# Patient Record
Sex: MALE
[De-identification: names, ages, dates, MRNs, and addresses within clinical notes are randomized per-mention and may not be internally consistent; named-entity substitution may affect disease eponyms.]

---

## 2023-04-21 PROBLEM — Z00.00 ENCOUNTER FOR PREVENTIVE HEALTH EXAMINATION: Status: ACTIVE | Noted: 2023-04-21

## 2023-05-16 ENCOUNTER — APPOINTMENT (OUTPATIENT)
Dept: NEPHROLOGY | Facility: CLINIC | Age: 38
End: 2023-05-16
Payer: COMMERCIAL

## 2023-05-16 ENCOUNTER — NON-APPOINTMENT (OUTPATIENT)
Age: 38
End: 2023-05-16

## 2023-05-16 VITALS — SYSTOLIC BLOOD PRESSURE: 126 MMHG | WEIGHT: 182 LBS | DIASTOLIC BLOOD PRESSURE: 80 MMHG | HEART RATE: 68 BPM

## 2023-05-16 DIAGNOSIS — R79.89 OTHER SPECIFIED ABNORMAL FINDINGS OF BLOOD CHEMISTRY: ICD-10-CM

## 2023-05-16 DIAGNOSIS — E83.52 HYPERCALCEMIA: ICD-10-CM

## 2023-05-16 DIAGNOSIS — F90.9 ATTENTION-DEFICIT HYPERACTIVITY DISORDER, UNSPECIFIED TYPE: ICD-10-CM

## 2023-05-16 DIAGNOSIS — E87.20 ACIDOSIS, UNSPECIFIED: ICD-10-CM

## 2023-05-16 DIAGNOSIS — Z78.9 OTHER SPECIFIED HEALTH STATUS: ICD-10-CM

## 2023-05-16 DIAGNOSIS — I10 ESSENTIAL (PRIMARY) HYPERTENSION: ICD-10-CM

## 2023-05-16 PROCEDURE — 99204 OFFICE O/P NEW MOD 45 MIN: CPT

## 2023-05-16 RX ORDER — METOPROLOL SUCCINATE 50 MG/1
50 TABLET, EXTENDED RELEASE ORAL
Refills: 0 | Status: ACTIVE | COMMUNITY

## 2023-05-16 RX ORDER — DEXTROAMPHETAMINE SULFATE, DEXTROAMPHETAMINE SACCHARATE, AMPHETAMINE SULFATE AND AMPHETAMINE ASPARTATE 5; 5; 5; 5 MG/1; MG/1; MG/1; MG/1
20 CAPSULE, EXTENDED RELEASE ORAL
Refills: 0 | Status: ACTIVE | COMMUNITY

## 2023-05-16 RX ORDER — FENOFIBRATE 145 MG/1
145 TABLET, COATED ORAL
Refills: 0 | Status: ACTIVE | COMMUNITY

## 2023-05-16 RX ORDER — COLD-HOT PACK
125 MCG EACH MISCELLANEOUS
Refills: 0 | Status: ACTIVE | COMMUNITY

## 2023-05-16 RX ORDER — EZETIMIBE 10 MG/1
10 TABLET ORAL
Refills: 0 | Status: ACTIVE | COMMUNITY

## 2023-05-16 NOTE — PHYSICAL EXAM
[General Appearance - Alert] : alert [General Appearance - In No Acute Distress] : in no acute distress [] : no respiratory distress [Auscultation Breath Sounds / Voice Sounds] : lungs were clear to auscultation bilaterally [Heart Rate And Rhythm] : heart rate was normal and rhythm regular [Heart Sounds] : normal S1 and S2 [Heart Sounds Gallop] : no gallops [Murmurs] : no murmurs [Heart Sounds Pericardial Friction Rub] : no pericardial rub [No CVA Tenderness] : no ~M costovertebral angle tenderness [Edema] : there was no peripheral edema [Oriented To Time, Place, And Person] : oriented to person, place, and time [Impaired Insight] : insight and judgment were intact [Affect] : the affect was normal

## 2023-05-22 LAB
25(OH)D3 SERPL-MCNC: 64.9 NG/ML
ALBUMIN SERPL ELPH-MCNC: 5 G/DL
ANION GAP SERPL CALC-SCNC: 14 MMOL/L
APPEARANCE: CLEAR
BACTERIA: NEGATIVE /HPF
BILIRUBIN URINE: NEGATIVE
BLOOD URINE: NEGATIVE
BUN SERPL-MCNC: 21 MG/DL
CALCIUM SERPL-MCNC: 10 MG/DL
CAST: 0 /LPF
CHLORIDE SERPL-SCNC: 103 MMOL/L
CO2 SERPL-SCNC: 25 MMOL/L
COLOR: YELLOW
CREAT SERPL-MCNC: 0.96 MG/DL
EGFR: 103 ML/MIN/1.73M2
EPITHELIAL CELLS: 0 /HPF
GLUCOSE QUALITATIVE U: NEGATIVE MG/DL
GLUCOSE SERPL-MCNC: 96 MG/DL
KETONES URINE: NEGATIVE MG/DL
LEUKOCYTE ESTERASE URINE: NEGATIVE
MICROSCOPIC-UA: NORMAL
NITRITE URINE: NEGATIVE
PH URINE: 6
PHOSPHATE SERPL-MCNC: 3.1 MG/DL
POTASSIUM SERPL-SCNC: 4.3 MMOL/L
PROTEIN URINE: NEGATIVE MG/DL
RED BLOOD CELLS URINE: 0 /HPF
SODIUM SERPL-SCNC: 142 MMOL/L
SPECIFIC GRAVITY URINE: 1.01
UROBILINOGEN URINE: 0.2 MG/DL
WHITE BLOOD CELLS URINE: 0 /HPF

## 2023-05-30 ENCOUNTER — TRANSCRIPTION ENCOUNTER (OUTPATIENT)
Age: 38
End: 2023-05-30

## 2023-06-08 NOTE — HISTORY OF PRESENT ILLNESS
[FreeTextEntry1] : 36 yo man here for further evaluation and management of possible dehydration in setting of hypercalcemia and azotemia. Saw endo for elevated Calcium- reports negative work up and was told that he is dehydrated. \par + HTN, No DM cancers, thyroid disease. No prior h/o kidney issues\par No overt GI losses, but does mention that he has a 1 yo and frequently get ill when child is ill- include diarrhea- not much but has happened. Says he drinks a lot. \par Able to sleep throughout the night without needing to void. Does void frequently at work, but he uses a bathroom trip to help clear his head and does not always pass a lot of urine. Urine mostly yellow, occ clear towards end of work day.\par No regular use of NSAIDS- took a few for a recent injury over past few weeks.\par No PPIs \par No FHx of kidney issues.\par Denies flank pain, dysuria, hematuria or frothy urine

## 2023-06-08 NOTE — CONSULT LETTER
[Dear  ___] : Dear ~IMTIAZ, [Consult Letter:] : I had the pleasure of evaluating your patient, [unfilled]. [Please see my note below.] : Please see my note below. [Consult Closing:] : Thank you very much for allowing me to participate in the care of this patient.  If you have any questions, please do not hesitate to contact me. [FreeTextEntry2] : Gabriele Varela MD\par [address]\[place] [address]  [FreeTextEntry1] : His blood pressure was 126/80 mmHg and his exam was without focal findings. I sent him for repeat labs and urine studies. I will keep you apprised of my findings.  [FreeTextEntry3] : Best personal regards,\par Khloe\par \par Khloe Zamora MD, FACP\par Professor of Medicine\par [employer] [employer] [facility] [employer] [facility]\par \par

## 2023-06-08 NOTE — ASSESSMENT
[FreeTextEntry1] : all lab data was reviewed with patient in detail from 4/14/2023\par B/creat 21/1.48, K 4.3; CO2 16, Ca 10.4\par B/creat 20/1.21 CO2 26 in 11/2022\par 38 yo man with azotemia, HTN, hypercalcemia and probable metabolic acidosis on last lab data\par BP acceptable here today.\par reviewed home BP monitoring- may want to implement- info sheet provided\par Send for repeat labs now with u/a\par h/o and volume status and Na level not c/w DI\par Ate and drank fluids prior this OV- will see how he is trending.\par Can track I/O\par Typical volume contraction would raise serum CO20 his latest was 16- ? diarrhea or other intrinsic renal process- await new data \par also consider a further reduction in Vit D- prior level was 74 (2/2023)- from his portal on phone.\par At that time dosage reduced from 10K daily to 5K daily-\par \par will call to discuss results and plan \par f/u OV TBD after new data\par \par \par ADDENDUM:\par repeat labs WNL- Ca 10,  creat .96, u/a bland (SG 1.0080 Vit d  64.9- high normal-\par no additional work up indicated at this time\par reminded pt to drink water prior to having labs drawn\par f/u here as needed at discretion of Dr. Varela.\par

## 2023-07-11 ENCOUNTER — APPOINTMENT (OUTPATIENT)
Dept: NEPHROLOGY | Facility: CLINIC | Age: 38
End: 2023-07-11

## 2024-11-03 ENCOUNTER — EMERGENCY (EMERGENCY)
Facility: HOSPITAL | Age: 39
LOS: 1 days | Discharge: HOME | End: 2024-11-03
Admitting: EMERGENCY MEDICINE
Payer: COMMERCIAL

## 2024-11-03 VITALS
OXYGEN SATURATION: 99 % | SYSTOLIC BLOOD PRESSURE: 134 MMHG | TEMPERATURE: 98 F | RESPIRATION RATE: 18 BRPM | DIASTOLIC BLOOD PRESSURE: 90 MMHG | HEART RATE: 73 BPM | WEIGHT: 175.05 LBS

## 2024-11-03 DIAGNOSIS — K64.4 RESIDUAL HEMORRHOIDAL SKIN TAGS: ICD-10-CM

## 2024-11-03 DIAGNOSIS — E78.5 HYPERLIPIDEMIA, UNSPECIFIED: ICD-10-CM

## 2024-11-03 DIAGNOSIS — I10 ESSENTIAL (PRIMARY) HYPERTENSION: ICD-10-CM

## 2024-11-03 PROCEDURE — 99284 EMERGENCY DEPT VISIT MOD MDM: CPT

## 2024-11-03 RX ORDER — HYDROCORTISONE 1 %
1 OINTMENT (GRAM) TOPICAL
Qty: 1 | Refills: 0
Start: 2024-11-03 | End: 2024-11-16

## 2024-11-03 NOTE — ED PROVIDER NOTE - CLINICAL SUMMARY MEDICAL DECISION MAKING FREE TEXT BOX
40 y/o m presents c/o external hemorrhoid.  Pt with nonbleeding, nonthrombosed external hemorrhoid on exam, pt seen in ED by surgery who recommends supportive care, sitz baths, topical steroid, f/u in office.

## 2024-11-03 NOTE — ED PROVIDER NOTE - GASTROINTESTINAL, MLM
Abdomen soft, non-tender, no guarding.  Rectal ~1.5 cm nonthrombosed, non bleeding external hemorrhoid

## 2024-11-03 NOTE — ED PROVIDER NOTE - PATIENT PORTAL LINK FT
You can access the FollowMyHealth Patient Portal offered by [facility] by registering at the following website: [email or web address]. By joining [facility]’s FollowMyHealth portal, you will also be able to view your health information using other applications (apps) compatible with our system.

## 2024-11-03 NOTE — CONSULT NOTE ADULT - SUBJECTIVE AND OBJECTIVE BOX
40yo Male pt with PMH of HTN, HLD, Hemorrhoids, ADHD; and no remarkable PSH. Patient came to ED with complaints of hemorrhoid causing discomfort and pain for the past 4-5 days. Patient refers he was traveling, and had change in diet having stomach discomfort, now improved, and started having having pain from perianal area and discomfort when seating. Patient was previously seen 2 months ago by Dr. Grimm for internal hemorrhoids with some bleeding on wiping, and around 3 years ago for external hemorrhoids, refers previous episodes but never as bad as this one. Patient refers this occasion tried conservative treatment with fiber rich meals, sitz baths with epsom salts, Prep-H ointment, and lidocaine wipes.     In the ED, pt afebrile, nontachycardic, normotensive, and satting on RA. On exam, perianal and anal area with skin with external hemorrhoid and visualization of internal hemorrhoid component, moderate tenderness on manipulation.     PMH: HTN, HLD, hemorrhoids, ADHD  PSHx: Denies  Medications: Aderall, metoprolol, fenofibrate, zetia  Allergies: Denies  Social Hx: No smoker, alcohol use 2-3 days/week - 1/2 bottle of wine  Family Hx: Father with non-hodgkin lymphoma and DM.   Last colonoscopy: Denies  Last EGD: Denies    T(C): 36.8 (11-03-24 @ 11:31), Max: 36.8 (11-03-24 @ 11:31)  HR: 73 (11-03-24 @ 11:31) (73 - 73)  BP: 134/90 (11-03-24 @ 11:31) (134/90 - 134/90)  RR: 18 (11-03-24 @ 11:31) (18 - 18)  SpO2: 99% (11-03-24 @ 11:31) (99% - 99%)    Physical Exam  General: AAOx3, NAD, laying comfortably in bed  Cardio: S1,S2, No MRG  Pulm: Nonlabored breathing  Abdomen: Soft, NT, ND, NG, NR.  Perianal and anal area with skin with external hemorrhoid and visualization of internal hemorrhoid component, moderate tenderness on manipulation.   Extremities: WWP, peripheral pulses appreciated

## 2024-11-03 NOTE — ED ADULT NURSE NOTE - OBJECTIVE STATEMENT
Pt c/o anal pain due to hemorrhoids, states he has tried using lidocaine whips, Preparation H, and epsom salt baths with no relief. Pt sitting in chair comfortably, pt aox4, respirations even and unlabored, NAD at this time. Able to make needs known. Safety and fall precautions maintained.

## 2024-11-03 NOTE — ED PROVIDER NOTE - OBJECTIVE STATEMENT
40 y/o m presents c/o hemorrhoid for the past few days.  Pt stating it is uncomfortable, sometimes painful.  Pt denies rectal bleeding, straining to have bowel movement, abd pain, all other ROS negative.

## 2024-11-03 NOTE — CONSULT NOTE ADULT - ASSESSMENT
38yo Male pt with complaints of hemorrhoid causing discomfort and pain for the past 4-5 days. Patient was previously seen 2 months ago by Dr. Grimm for internal hemorrhoids with some bleeding on wiping. Patient refers this occasion tried conservative treatment with fiber rich meals, sitz baths with epsom salts, Prep-H ointment, and lidocaine wipes. In the ED, VSS stable.  On exam, perianal and anal area with skin with external hemorrhoid and visualization of internal hemorrhoid component, moderate tenderness on manipulation. Patient not amnable to ED drainage at the time due to external hemorrhoid with internal component, it is expected for pain to improve within the next few days with further conservative measures and pain control.     Plan:    Outpatient follow up with Dr. Grimm on 11/04  Pain management  Sitz baths  Fiber rich diet, fiber supplements if necessary  Good area hygiene  Dispo as per ED